# Patient Record
Sex: FEMALE | Employment: OTHER | URBAN - METROPOLITAN AREA
[De-identification: names, ages, dates, MRNs, and addresses within clinical notes are randomized per-mention and may not be internally consistent; named-entity substitution may affect disease eponyms.]

---

## 2018-04-17 ENCOUNTER — HOSPITAL ENCOUNTER (OUTPATIENT)
Dept: OUTPATIENT PROCEDURES | Facility: CLINIC | Age: 78
Discharge: HOME | End: 2018-04-17
Attending: OPHTHALMOLOGY | Admitting: OPHTHALMOLOGY
Payer: MEDICARE

## 2018-04-17 PROCEDURE — 66821 AFTER CATARACT LASER SURGERY: CPT | Mod: LT | Performed by: OPHTHALMOLOGY

## 2018-08-28 ENCOUNTER — OFFICE VISIT (OUTPATIENT)
Dept: OTOLARYNGOLOGY | Facility: CLINIC | Age: 78
End: 2018-08-28
Payer: COMMERCIAL

## 2018-08-28 VITALS
BODY MASS INDEX: 25.11 KG/M2 | SYSTOLIC BLOOD PRESSURE: 157 MMHG | WEIGHT: 133 LBS | HEART RATE: 72 BPM | TEMPERATURE: 97.8 F | HEIGHT: 61 IN | DIASTOLIC BLOOD PRESSURE: 76 MMHG

## 2018-08-28 DIAGNOSIS — H92.20 BLOOD IN EAR CANAL, UNSPECIFIED LATERALITY: Primary | ICD-10-CM

## 2018-08-28 PROCEDURE — 99203 OFFICE O/P NEW LOW 30 MIN: CPT | Performed by: OTOLARYNGOLOGY

## 2018-08-28 RX ORDER — LISINOPRIL 2.5 MG/1
2.5 TABLET ORAL DAILY
COMMUNITY
Start: 2018-08-20

## 2018-08-28 RX ORDER — VIT A/VIT C/VIT E/ZINC/COPPER 4296-226
2 CAPSULE ORAL DAILY
COMMUNITY
Start: 2008-12-29

## 2018-08-28 RX ORDER — SENNOSIDES 8.6 MG
650 CAPSULE ORAL DAILY PRN
COMMUNITY
Start: 2018-02-27

## 2018-08-28 RX ORDER — PHENOL 1.4 %
600 AEROSOL, SPRAY (ML) MUCOUS MEMBRANE DAILY
COMMUNITY
Start: 2006-08-21

## 2018-08-28 RX ORDER — LANOLIN ALCOHOL/MO/W.PET/CERES
1000 CREAM (GRAM) TOPICAL DAILY
COMMUNITY
Start: 2017-07-20

## 2018-08-28 RX ORDER — GABAPENTIN 100 MG/1
200 CAPSULE ORAL PRN
COMMUNITY
Start: 2018-02-02

## 2018-08-28 RX ORDER — TRAMADOL HYDROCHLORIDE 50 MG/1
50 TABLET ORAL PRN
COMMUNITY
Start: 2009-05-06 | End: 2024-04-08

## 2018-08-28 RX ORDER — ONDANSETRON 4 MG/1
4 TABLET, ORALLY DISINTEGRATING ORAL PRN
COMMUNITY
Start: 2018-03-23

## 2018-08-28 RX ORDER — BETAMETHASONE DIPROPIONATE 0.5 MG/G
0.05 CREAM TOPICAL DAILY
COMMUNITY
Start: 2018-02-08

## 2018-08-28 RX ORDER — LEVOTHYROXINE SODIUM 75 UG/1
75 TABLET ORAL DAILY
COMMUNITY
Start: 2006-08-21

## 2018-08-28 ASSESSMENT — PAIN SCALES - GENERAL: PAINLEVEL: NO PAIN (0)

## 2018-08-28 NOTE — MR AVS SNAPSHOT
"              After Visit Summary   2018    Sharon Dinh    MRN: [id number]           Patient Information     Date Of Birth          1940        Visit Information        Provider Department      2018 8:45 AM Laurita Parham MD [facility]        Today's Diagnoses     Blood in ear canal, unspecified laterality    -  1      Care Instructions    Per Physician's instructions            Follow-ups after your visit        Who to contact     If you have questions or need follow up information about today's clinic visit or your schedule please contact [facility] directly at [phone].  Normal or non-critical lab and imaging results will be communicated to you by [employer], letter or phone within 4 business days after the clinic has received the results. If you do not hear from us within 7 days, please contact the clinic through [employer] or phone. If you have a critical or abnormal lab result, we will notify you by phone as soon as possible.  Submit refill requests through [employer] or call your pharmacy and they will forward the refill request to us. Please allow 3 business days for your refill to be completed.          Additional Information About Your Visit        MyChart Information     [employer] lets you send messages to your doctor, view your test results, renew your prescriptions, schedule appointments and more. To sign up, go to [email or web address] . Click on \"Log in\" on the left side of the screen, which will take you to the Welcome page. Then click on \"Sign up Now\" on the right side of the page.     You will be asked to enter the access code listed below, as well as some personal information. Please follow the directions to create your username and password.     Your access code is: [email or web address]  Expires: 2019 11:33 AM     Your access code will  in 90 days. If you need help or a new code, please call your [facility] or [phone].        Care EveryWhere " "ID     This is your Care EveryWhere ID. This could be used by other organizations to access your [place] medical records  [id number]        Your Vitals Were     Pulse Temperature Height BMI (Body Mass Index)          72 97.8  F (36.6  C) (Tympanic) 1.549 m (5' 1\") 25.13 kg/m2         Blood Pressure from Last 3 Encounters:   08/28/18 157/76    Weight from Last 3 Encounters:   08/28/18 60.3 kg (133 lb)              Today, you had the following     No orders found for display         Today's Medication Changes          These changes are accurate as of 8/28/18 11:59 PM.  If you have any questions, ask your nurse or doctor.               Stop taking these medicines if you haven't already. Please contact your care team if you have questions.     FLAGYL 500 MG tablet   Generic drug:  metroNIDAZOLE   Stopped by:  Laurita Parham MD           LOMOTIL PO   Stopped by:  Laurita Parham MD           PERCOCET 5-325 MG per tablet   Generic drug:  oxyCODONE-acetaminophen   Stopped by:  Laurita Parham MD                   Information about OPIOIDS     PRESCRIPTION OPIOIDS: WHAT YOU NEED TO KNOW   We gave you an opioid (narcotic) pain medicine. It is important to manage your pain, but opioids are not always the best choice. You should first try all the other options your care team gave you. Take this medicine for as short a time (and as few doses) as possible.    Some activities can increase your pain, such as bandage changes or therapy sessions. It may help to take your pain medicine 30 to 60 minutes before these activities. Reduce your stress by getting enough sleep, working on hobbies you enjoy and practicing relaxation or meditation. Talk to your care team about ways to manage your pain beyond prescription opioids.    These medicines have risks:    DO NOT drive when on new or higher doses of pain medicine. These medicines can affect your alertness and reaction times, and you could be arrested for driving under the influence " (DUI). If you need to use opioids long-term, talk to your care team about driving.    DO NOT operate heavy machinery    DO NOT do any other dangerous activities while taking these medicines.    DO NOT drink any alcohol while taking these medicines.     If the opioid prescribed includes acetaminophen, DO NOT take with any other medicines that contain acetaminophen. Read all labels carefully. Look for the word  acetaminophen  or  Tylenol.  Ask your pharmacist if you have questions or are unsure.    You can get addicted to pain medicines, especially if you have a history of addiction (chemical, alcohol or substance dependence). Talk to your care team about ways to reduce this risk.    All opioids tend to cause constipation. Drink plenty of water and eat foods that have a lot of fiber, such as fruits, vegetables, prune juice, apple juice and high-fiber cereal. Take a laxative (Miralax, milk of magnesia, Colace, Senna) if you don t move your bowels at least every other day. Other side effects include upset stomach, sleepiness, dizziness, throwing up, tolerance (needing more of the medicine to have the same effect), physical dependence and slowed breathing.    Store your pills in a secure place, locked if possible. We will not replace any lost or stolen medicine. If you don t finish your medicine, please throw away (dispose) as directed by your pharmacist. The [place] Pollution Control Agency has more information about safe disposal: https://www.pca.[facility].mn.us/living-green/managing-unwanted-medications         Primary Care Provider    Chyna Lopez MD       No address on file        Equal Access to Services     DAILY CHEN AH: Hadii cyn Rodriguez, waaxda lujoannadaha, qaybta kaalmada carson, yuki olivo. So [facility] [phone].    ATENCIÓN: Si habla español, tiene a larsen disposición servicios gratuitos de asistencia lingüística. Llame al [phone].    We comply with applicable  federal civil rights laws and [place] laws. We do not discriminate on the basis of race, color, national origin, age, disability, sex, sexual orientation, or gender identity.            Thank you!     Thank you for choosing [facility]  for your care. Our goal is always to provide you with excellent care. Hearing back from our patients is one way we can continue to improve our services. Please take a few minutes to complete the written survey that you may receive in the mail after your visit with us. Thank you!             Your Updated Medication List - Protect others around you: Learn how to safely use, store and throw away your medicines at www.disposemymeds.org.          This list is accurate as of 8/28/18 11:59 PM.  Always use your most recent med list.                   Brand Name Dispense Instructions for use Diagnosis    acetaminophen 650 MG CR tablet    TYLENOL     Take 650 mg by mouth daily as needed        betamethasone dipropionate 0.05 % cream    DIPROSONE     Apply 0.05 mg topically daily        calcium carbonate 600 MG tablet   Generic drug:  calcium carbonate      Take 600 mg by mouth daily        cyanocobalamin 1000 MCG tablet    vitamin  B-12     Take 1,000 mcg by mouth daily        gabapentin 100 MG capsule    NEURONTIN     Take 200 mg by mouth as needed        lisinopril 2.5 MG tablet    PRINIVIL/Zestril     Take 2.5 mg by mouth daily        ondansetron 4 MG ODT tab    ZOFRAN-ODT     Place 4 mg under the tongue as needed        PRESERVISION AREDS Caps      Take 2 tablets by mouth daily        SM CALCIUM 500/VITAMIN D3 500-400 MG-UNIT Tabs per tablet   Generic drug:  calcium carbonate-vitamin D      Take 1 tablet by mouth daily        SYNTHROID 75 MCG tablet   Generic drug:  levothyroxine      Take 75 mcg by mouth daily        traMADol 50 MG tablet    ULTRAM     Take 50 mg by mouth as needed

## 2018-08-28 NOTE — PROGRESS NOTES
History of Present Illness - Sharon Dinh is a 78 year old female who presents with a 2 week history of having blood and pain in the right ear. The ear now just feels plugged on the right. She denies placing anything in the ear. She has no prior ear surgery.    Past Medical History -   No ear surgery    Current Medications -   Current Outpatient Prescriptions:      acetaminophen (TYLENOL) 650 MG CR tablet, Take 650 mg by mouth daily as needed, Disp: , Rfl:      betamethasone dipropionate (DIPROSONE) 0.05 % cream, Apply 0.05 mg topically daily, Disp: , Rfl:      calcium carbonate (CALCIUM CARBONATE) 600 MG tablet, Take 600 mg by mouth daily, Disp: , Rfl:      calcium carbonate-vitamin D (SM CALCIUM 500/VITAMIN D3) 500-400 MG-UNIT TABS per tablet, Take 1 tablet by mouth daily, Disp: , Rfl:      cyanocobalamin (VITAMIN  B-12) 1000 MCG tablet, Take 1,000 mcg by mouth daily, Disp: , Rfl:      gabapentin (NEURONTIN) 100 MG capsule, Take 200 mg by mouth as needed, Disp: , Rfl:      levothyroxine (SYNTHROID) 75 MCG tablet, Take 75 mcg by mouth daily, Disp: , Rfl:      lisinopril (PRINIVIL/ZESTRIL) 2.5 MG tablet, Take 2.5 mg by mouth daily, Disp: , Rfl:      Multiple Vitamins-Minerals (PRESERVISION AREDS) CAPS, Take 2 tablets by mouth daily, Disp: , Rfl:      ondansetron (ZOFRAN-ODT) 4 MG ODT tab, Place 4 mg under the tongue as needed, Disp: , Rfl:      traMADol (ULTRAM) 50 MG tablet, Take 50 mg by mouth as needed, Disp: , Rfl:     Allergies -   Allergies   Allergen Reactions     Beta Adrenergic Blockers      lightheadedness     [employer] Hives     Pt denies topical iodine allergy     Diphenhydramine      Other reaction(s): Other  Made pt's blood pressure go up     Food      Soy sauce causing swollen lips     Vegetable Enzyme Nausea       Social History -   Social History     Social History     Marital status:      Spouse name: N/A     Number of children: N/A     Years of education: N/A  "    Social History Main Topics     Smoking status: Not on file     Smokeless tobacco: Not on file     Alcohol use Not on file     Drug use: Not on file     Sexual activity: Not on file     Other Topics Concern     Not on file     Social History Narrative       Family History -   Family History   Problem Relation Age of Onset     Cerebrovascular Disease Mother      Colon Cancer Mother      Coronary Artery Disease Father      Hypertension Father        Review of Systems - As per HPI and PMHx, otherwise 7 system review of the head and neck negative. 10+ system review negative.    Physical Exam  /76 (BP Location: Right arm, Patient Position: Sitting, Cuff Size: Adult Regular)  Pulse 72  Temp 97.8  F (36.6  C) (Tympanic)  Ht 1.549 m (5' 1\")  Wt 60.3 kg (133 lb)  BMI 25.13 kg/m2  General - The patient is well nourished and well developed, and appears to have good nutritional status.  Alert and oriented to person and place, answers questions and cooperates with examination appropriately.   Head and Face - Normocephalic and atraumatic, with no gross asymmetry noted of the contour of the facial features.  The facial nerve is intact, with strong symmetric movements.  Voice and Breathing - The patient was breathing comfortably without the use of accessory muscles. There was no wheezing, stridor, or stertor.  The patients voice was clear and strong, and had appropriate pitch and quality.  Ears - Right ear canal with some dried blood. This was removed under the microscope, and the TM and middle ear appeared normal.  Eyes - Extraocular movements intact.  Sclera were not icteric or injected, conjunctiva were pink and moist.  Mouth - Examination of the oral cavity showed pink, healthy oral mucosa. No lesions or ulcerations noted.  The tongue was mobile and midline, and the dentition were in good condition.    Throat - The walls of the oropharynx were smooth, pink, moist, symmetric, and had no lesions or ulcerations.  The " tonsillar pillars and soft palate were symmetric.  The uvula was midline on elevation.  Neck - Normal midline excursion of the laryngotracheal complex during swallowing.  Full range of motion on passive movement.  Palpation of the occipital, submental, submandibular, internal jugular chain, and supraclavicular nodes did not demonstrate any abnormal lymph nodes or masses.  The carotid pulse was palpable bilaterally.  Palpation of the thyroid was soft and smooth, with no nodules or goiter appreciated.  The trachea was mobile and midline.  Nose - External contour is symmetric, no gross deflection or scars.  Nasal mucosa is pink and moist with no abnormal mucus.  The septum was midline and non-obstructive, turbinates of normal size and position.  No polyps, masses, or purulence noted on examination.          Assessment - Sharon Dinh is a 78 year old female with likely trauma to the right ear canal. No sign of infection. I recommended avoiding placing anything in the ears. Return if bleeding or drainage resumes.       Dr. Laurita Parham MD  Otolaryngology  [facility]

## 2018-08-28 NOTE — Clinical Note
8/28/2018         RE: Sharon Dinh  [address] [facility] [address]        Dear Colleague,    Thank you for referring your patient, Sharon Dinh, to the [facility]. Please see a copy of my visit note below.        History of Present Illness - Sharon Dinh is a 78 year old female who presents with a 2 week history of having blood and pain in the right ear. The ear now just feels plugged on the right. She denies placing anything in the ear. She has no prior ear surgery.    Past Medical History -   No ear surgery    Current Medications -   Current Outpatient Prescriptions:      acetaminophen (TYLENOL) 650 MG CR tablet, Take 650 mg by mouth daily as needed, Disp: , Rfl:      betamethasone dipropionate (DIPROSONE) 0.05 % cream, Apply 0.05 mg topically daily, Disp: , Rfl:      calcium carbonate (CALCIUM CARBONATE) 600 MG tablet, Take 600 mg by mouth daily, Disp: , Rfl:      calcium carbonate-vitamin D (SM CALCIUM 500/VITAMIN D3) 500-400 MG-UNIT TABS per tablet, Take 1 tablet by mouth daily, Disp: , Rfl:      cyanocobalamin (VITAMIN  B-12) 1000 MCG tablet, Take 1,000 mcg by mouth daily, Disp: , Rfl:      gabapentin (NEURONTIN) 100 MG capsule, Take 200 mg by mouth as needed, Disp: , Rfl:      levothyroxine (SYNTHROID) 75 MCG tablet, Take 75 mcg by mouth daily, Disp: , Rfl:      lisinopril (PRINIVIL/ZESTRIL) 2.5 MG tablet, Take 2.5 mg by mouth daily, Disp: , Rfl:      Multiple Vitamins-Minerals (PRESERVISION AREDS) CAPS, Take 2 tablets by mouth daily, Disp: , Rfl:      ondansetron (ZOFRAN-ODT) 4 MG ODT tab, Place 4 mg under the tongue as needed, Disp: , Rfl:      traMADol (ULTRAM) 50 MG tablet, Take 50 mg by mouth as needed, Disp: , Rfl:     Allergies -   Allergies   Allergen Reactions     Beta Adrenergic Blockers      lightheadedness     Diagn[employer] Hives     Pt denies topical iodine allergy     Diphenhydramine      Other reaction(s): Other  Made pt's blood pressure go up      "Food      Soy sauce causing swollen lips     Vegetable Enzyme Nausea       Social History -   Social History     Social History     Marital status:      Spouse name: N/A     Number of children: N/A     Years of education: N/A     Social History Main Topics     Smoking status: Not on file     Smokeless tobacco: Not on file     Alcohol use Not on file     Drug use: Not on file     Sexual activity: Not on file     Other Topics Concern     Not on file     Social History Narrative       Family History -   Family History   Problem Relation Age of Onset     Cerebrovascular Disease Mother      Colon Cancer Mother      Coronary Artery Disease Father      Hypertension Father        Review of Systems - As per HPI and PMHx, otherwise 7 system review of the head and neck negative. 10+ system review negative.    Physical Exam  /76 (BP Location: Right arm, Patient Position: Sitting, Cuff Size: Adult Regular)  Pulse 72  Temp 97.8  F (36.6  C) (Tympanic)  Ht 1.549 m (5' 1\")  Wt 60.3 kg (133 lb)  BMI 25.13 kg/m2  General - The patient is well nourished and well developed, and appears to have good nutritional status.  Alert and oriented to person and place, answers questions and cooperates with examination appropriately.   Head and Face - Normocephalic and atraumatic, with no gross asymmetry noted of the contour of the facial features.  The facial nerve is intact, with strong symmetric movements.  Voice and Breathing - The patient was breathing comfortably without the use of accessory muscles. There was no wheezing, stridor, or stertor.  The patients voice was clear and strong, and had appropriate pitch and quality.  Ears - Right ear canal with some dried blood. This was removed under the microscope, and the TM and middle ear appeared normal.  Eyes - Extraocular movements intact.  Sclera were not icteric or injected, conjunctiva were pink and moist.  Mouth - Examination of the oral cavity showed pink, healthy oral " mucosa. No lesions or ulcerations noted.  The tongue was mobile and midline, and the dentition were in good condition.    Throat - The walls of the oropharynx were smooth, pink, moist, symmetric, and had no lesions or ulcerations.  The tonsillar pillars and soft palate were symmetric.  The uvula was midline on elevation.  Neck - Normal midline excursion of the laryngotracheal complex during swallowing.  Full range of motion on passive movement.  Palpation of the occipital, submental, submandibular, internal jugular chain, and supraclavicular nodes did not demonstrate any abnormal lymph nodes or masses.  The carotid pulse was palpable bilaterally.  Palpation of the thyroid was soft and smooth, with no nodules or goiter appreciated.  The trachea was mobile and midline.  Nose - External contour is symmetric, no gross deflection or scars.  Nasal mucosa is pink and moist with no abnormal mucus.  The septum was midline and non-obstructive, turbinates of normal size and position.  No polyps, masses, or purulence noted on examination.          Assessment - Sharon Dinh is a 78 year old female with likely trauma to the right ear canal. No sign of infection. I recommended avoiding placing anything in the ears. Return if bleeding or drainage resumes.       Dr. Laurita Parham MD  Otolaryngology  [facility] [place]        Again, thank you for allowing me to participate in the care of your patient.        Sincerely,        Laurita Parham MD

## 2018-08-28 NOTE — NURSING NOTE
"Initial /76 (BP Location: Right arm, Patient Position: Sitting, Cuff Size: Adult Regular)  Pulse 72  Temp 97.8  F (36.6  C) (Tympanic)  Ht 1.549 m (5' 1\")  Wt 60.3 kg (133 lb)  BMI 25.13 kg/m2 Estimated body mass index is 25.13 kg/(m^2) as calculated from the following:    Height as of this encounter: 1.549 m (5' 1\").    Weight as of this encounter: 60.3 kg (133 lb). .    Mallorie Tsai CMA    "

## 2021-09-25 ENCOUNTER — HOSPITAL ENCOUNTER (EMERGENCY)
Facility: CLINIC | Age: 81
Discharge: HOME | End: 2021-09-25
Attending: FAMILY MEDICINE | Admitting: FAMILY MEDICINE
Payer: MEDICARE

## 2021-09-25 ENCOUNTER — NURSE TRIAGE (OUTPATIENT)
Dept: NURSING | Facility: CLINIC | Age: 81
End: 2021-09-25

## 2021-09-25 ENCOUNTER — APPOINTMENT (OUTPATIENT)
Dept: GENERAL RADIOLOGY | Facility: CLINIC | Age: 81
End: 2021-09-25
Attending: FAMILY MEDICINE
Payer: MEDICARE

## 2021-09-25 VITALS
RESPIRATION RATE: 16 BRPM | OXYGEN SATURATION: 99 % | WEIGHT: 125 LBS | HEART RATE: 75 BPM | SYSTOLIC BLOOD PRESSURE: 165 MMHG | TEMPERATURE: 97.6 F | DIASTOLIC BLOOD PRESSURE: 76 MMHG | BODY MASS INDEX: 23.62 KG/M2

## 2021-09-25 DIAGNOSIS — M94.0 COSTOCHONDRITIS: ICD-10-CM

## 2021-09-25 LAB
ALBUMIN SERPL-MCNC: 4.2 G/DL (ref 3.4–5)
ALP SERPL-CCNC: 111 U/L (ref 40–150)
ALT SERPL W P-5'-P-CCNC: 18 U/L (ref 0–50)
ANION GAP SERPL CALCULATED.3IONS-SCNC: 5 MMOL/L (ref 3–14)
AST SERPL W P-5'-P-CCNC: 15 U/L (ref 0–45)
BASOPHILS # BLD AUTO: 0.1 10E3/UL (ref 0–0.2)
BASOPHILS NFR BLD AUTO: 1 %
BILIRUB SERPL-MCNC: 0.3 MG/DL (ref 0.2–1.3)
BUN SERPL-MCNC: 19 MG/DL (ref 7–30)
CALCIUM SERPL-MCNC: 8.7 MG/DL (ref 8.5–10.1)
CHLORIDE BLD-SCNC: 107 MMOL/L (ref 94–109)
CO2 SERPL-SCNC: 28 MMOL/L (ref 20–32)
CREAT SERPL-MCNC: 0.89 MG/DL (ref 0.52–1.04)
EOSINOPHIL # BLD AUTO: 0.1 10E3/UL (ref 0–0.7)
EOSINOPHIL NFR BLD AUTO: 2 %
ERYTHROCYTE [DISTWIDTH] IN BLOOD BY AUTOMATED COUNT: 12.8 % (ref 10–15)
GFR SERPL CREATININE-BSD FRML MDRD: 61 ML/MIN/1.73M2
GLUCOSE BLD-MCNC: 86 MG/DL (ref 70–99)
HCT VFR BLD AUTO: 39.5 % (ref 35–47)
HGB BLD-MCNC: 12.7 G/DL (ref 11.7–15.7)
HOLD SPECIMEN: NORMAL
IMM GRANULOCYTES # BLD: 0 10E3/UL
IMM GRANULOCYTES NFR BLD: 0 %
LYMPHOCYTES # BLD AUTO: 1.8 10E3/UL (ref 0.8–5.3)
LYMPHOCYTES NFR BLD AUTO: 20 %
MCH RBC QN AUTO: 30.3 PG (ref 26.5–33)
MCHC RBC AUTO-ENTMCNC: 32.2 G/DL (ref 31.5–36.5)
MCV RBC AUTO: 94 FL (ref 78–100)
MONOCYTES # BLD AUTO: 1 10E3/UL (ref 0–1.3)
MONOCYTES NFR BLD AUTO: 11 %
NEUTROPHILS # BLD AUTO: 5.8 10E3/UL (ref 1.6–8.3)
NEUTROPHILS NFR BLD AUTO: 66 %
NRBC # BLD AUTO: 0 10E3/UL
NRBC BLD AUTO-RTO: 0 /100
PLATELET # BLD AUTO: 235 10E3/UL (ref 150–450)
POTASSIUM BLD-SCNC: 4.3 MMOL/L (ref 3.4–5.3)
PROT SERPL-MCNC: 7.8 G/DL (ref 6.8–8.8)
RBC # BLD AUTO: 4.19 10E6/UL (ref 3.8–5.2)
SODIUM SERPL-SCNC: 140 MMOL/L (ref 133–144)
TROPONIN I SERPL-MCNC: <0.015 UG/L (ref 0–0.04)
WBC # BLD AUTO: 8.8 10E3/UL (ref 4–11)

## 2021-09-25 PROCEDURE — 93005 ELECTROCARDIOGRAM TRACING: CPT | Performed by: FAMILY MEDICINE

## 2021-09-25 PROCEDURE — 84484 ASSAY OF TROPONIN QUANT: CPT | Performed by: FAMILY MEDICINE

## 2021-09-25 PROCEDURE — 99285 EMERGENCY DEPT VISIT HI MDM: CPT | Mod: 25 | Performed by: FAMILY MEDICINE

## 2021-09-25 PROCEDURE — 36415 COLL VENOUS BLD VENIPUNCTURE: CPT | Performed by: FAMILY MEDICINE

## 2021-09-25 PROCEDURE — 93010 ELECTROCARDIOGRAM REPORT: CPT | Performed by: FAMILY MEDICINE

## 2021-09-25 PROCEDURE — 71046 X-RAY EXAM CHEST 2 VIEWS: CPT

## 2021-09-25 PROCEDURE — 85025 COMPLETE CBC W/AUTO DIFF WBC: CPT | Performed by: FAMILY MEDICINE

## 2021-09-25 PROCEDURE — 80053 COMPREHEN METABOLIC PANEL: CPT | Performed by: FAMILY MEDICINE

## 2021-09-25 NOTE — TELEPHONE ENCOUNTER
Patient calling about chest pain that started at 9:15 am that started on the left side but now it has spread across her chest and feels very heavy.  Protocol recommmends 911.   Patient agrees to hang up with me and call 911.  Kianna Casanova RN   09/25/21 12:39 PM  [facility] Nurse Advisor  COVID 19 Nurse Triage Plan/Patient Instructions    Please be aware that novel coronavirus (COVID-19) may be circulating in the community. If you develop symptoms such as fever, cough, or SOB or if you have concerns about the presence of another infection including coronavirus (COVID-19), please contact your health care provider or visit [email or web address].     Disposition/Instructions    Call to EMS/911 recommended. Follow protocol based instructions.     Bring Your Own Device:  Please also bring your smart device(s) (smart phones, tablets, laptops) and their charging cables for your personal use and to communicate with your care team during your visit.    Thank you for taking steps to prevent the spread of this virus.  o Limit your contact with others.  o Wear a simple mask to cover your cough.  o Wash your hands well and often.    Resources    [facility]: About COVID-19: [email or web address]    CDC: What to Do If You're Sick: www.cdc.gov/coronavirus/2019-ncov/about/steps-when-sick.html    CDC: Ending Home Isolation: www.cdc.gov/coronavirus/2019-ncov/hcp/disposition-in-home-patients.html     CDC: Caring for Someone: www.cdc.gov/coronavirus/2019-ncov/if-you-are-sick/care-for-someone.html     [facility]: Interim Guidance for Hospital Discharge to Home: www.health.[facility].mn.us/diseases/coronavirus/hcp/hospdischarge.pdf    [facility] clinical trials (COVID-19 research studies): [email or web address]/umn-[email or web address]-trials     Below are the COVID-19 hotlines at the [facility] of [facility] ([facility]). Interpreters are available.   o For health questions: Call 833-306-3560 or 1-132.284.7642 (7  a.m. to 7 p.m.)  o For questions about schools and childcare: Call 855-628-4319 or 1-441.345.3407 (7 a.m. to 7 p.m.)     Reason for Disposition    [1] Chest pain lasts > 5 minutes AND [2] described as crushing, pressure-like, or heavy    Additional Information    Negative: Severe difficulty breathing (e.g., struggling for each breath, speaks in single words)    Negative: Difficult to awaken or acting confused (e.g., disoriented, slurred speech)    Negative: Shock suspected (e.g., cold/pale/clammy skin, too weak to stand, low BP, rapid pulse)    Negative: [1] Chest pain lasts > 5 minutes AND [2] history of heart disease  (i.e., heart attack, bypass surgery, angina, angioplasty, CHF; not just a heart murmur)    Protocols used: CHEST PAIN-A-AH

## 2021-09-25 NOTE — ED PROVIDER NOTES
"  History     Chief Complaint   Patient presents with     Chest Pain     HPI  Sharon Dinh is a 81 year old female who comes in from home having had an episode of chest pain this morning.  It occurred about 9 AM at rest.  She describes it as a \"half dollar size\" pain above the left breast.  She pressed on the area and it hurt to the touch.  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  When she stood up the pain again abated.  She went home.  It has not gone away completely.  She took 4 baby aspirin and also improved but still is slightly present.  It has not been associated with nausea, diaphoresis, presyncope, dyspnea.  She has been Covid vaccinated.  The pain is only slightly present now.  It does not radiate to the arm or neck or elsewhere.  He recalls no specific injury.  She denies symptoms of recent illness.  She has not had any cough, sore throat, fever.  She is not having abdominal pain, bowel or new bladder symptoms.    Allergies:  Allergies   Allergen Reactions     Beta Adrenergic Blockers      lightheadedness     Diagnostic X[employer] Hives     Pt denies topical iodine allergy     Diphenhydramine      Other reaction(s): Other  Made pt's blood pressure go up     Food      Soy sauce causing swollen lips     Vegetable Enzyme Nausea       Problem List:    There are no problems to display for this patient.       Past Medical History:    No past medical history on file.    Past Surgical History:    No past surgical history on file.    Family History:    Family History   Problem Relation Age of Onset     Cerebrovascular Disease Mother      Colon Cancer Mother      Coronary Artery Disease Father      Hypertension Father        Social History:  Marital Status:   [2]  Social History     Tobacco Use     Smoking status: Never Smoker     Smokeless tobacco: Never Used   Substance Use Topics "     Alcohol use: No     Drug use: Not on file        Medications:    acetaminophen (TYLENOL) 650 MG CR tablet  betamethasone dipropionate (DIPROSONE) 0.05 % cream  calcium carbonate (CALCIUM CARBONATE) 600 MG tablet  calcium carbonate-vitamin D (SM CALCIUM 500/VITAMIN D3) 500-400 MG-UNIT TABS per tablet  cyanocobalamin (VITAMIN  B-12) 1000 MCG tablet  gabapentin (NEURONTIN) 100 MG capsule  levothyroxine (SYNTHROID) 75 MCG tablet  lisinopril (PRINIVIL/ZESTRIL) 2.5 MG tablet  Multiple Vitamins-Minerals (PRESERVISION AREDS) CAPS  ondansetron (ZOFRAN-ODT) 4 MG ODT tab  traMADol (ULTRAM) 50 MG tablet      Review of Systems    All other systems are reviewed and are negative    Physical Exam   BP: (!) 184/115  Pulse: 77  Temp: 97.6  F (36.4  C)  Resp: 16  Weight: 56.7 kg (125 lb)  SpO2: 98 %      Physical Exam    Nursing note and vitals were reviewed.  Constitutional: Awake and alert, adequately nourished and developed appearing 81-year-old in no apparent discomfort, who does not appear acutely ill, and who answers questions appropriately and cooperates with examination.  HEENT: EOMI.   Neck: Freely mobile.  Cardiovascular: Cardiac examination reveals normal heart rate and regular rhythm without murmur.  Pulmonary/Chest: Breathing is unlabored.  Breath sounds are clear and equal bilaterally.  There no retractions, tachypnea, rales, wheezes, or rhonchi.  He is tender over the left chest costochondral junction at the level of the fifth or sixth rib reproducing her symptoms without overlying erythema, crepitus, subcutaneous emphysema.  Abdomen: Soft, nontender, no HSM or masses rebound or guarding.  Musculoskeletal: Extremities are warm and well-perfused and without edema  Neurological: Alert, oriented, thought content logical, coherent   Skin: Warm, dry, no rashes.  Psychiatric: Affect broad and appropriate.      ED Course        Procedures              EKG Interpretation:      Interpreted by Alexandru Zamorano MD  Time  reviewed: 14:19  Symptoms at time of EKG: none   Rhythm: normal sinus   Rate: normal  Axis: normal  Ectopy: none  Conduction: normal  ST Segments/ T Waves: No ST-T wave changes  Q Waves: none  Comparison to prior: No old EKG available    Clinical Impression: normal EKG          Critical Care time:  none               Results for orders placed or performed during the hospital encounter of 09/25/21 (from the past 24 hour(s))   [place] Draw    Narrative    The following orders were created for panel order [place] Draw.  Procedure                               Abnormality         Status                     ---------                               -----------         ------                     Extra Blue Top Tube[[id number]]                              Final result               Extra Red Top Tube[[id number]]                               Final result               Extra Green Top (Lithium...[[id number]]                      Final result               Extra Green Top (Lithium...[[id number]]                      Final result               Extra Purple Top Tube[[id number]]                            Final result                 Please view results for these tests on the individual orders.   Extra Red Top Tube   Result Value Ref Range    Hold Specimen JIC    Extra Green Top (Lithium Heparin) Tube   Result Value Ref Range    Hold Specimen JIC    Extra Green Top (Lithium Heparin) Tube   Result Value Ref Range    Hold Specimen JIC    Extra Purple Top Tube   Result Value Ref Range    Hold Specimen JIC    CBC with platelets differential    Narrative    The following orders were created for panel order CBC with platelets differential.  Procedure                               Abnormality         Status                     ---------                               -----------         ------                     CBC with platelets and d...[[id number]]                      Final result                 Please view results for these tests on  the individual orders.   Comprehensive metabolic panel   Result Value Ref Range    Sodium 140 133 - 144 mmol/L    Potassium 4.3 3.4 - 5.3 mmol/L    Chloride 107 94 - 109 mmol/L    Carbon Dioxide (CO2) 28 20 - 32 mmol/L    Anion Gap 5 3 - 14 mmol/L    Urea Nitrogen 19 7 - 30 mg/dL    Creatinine 0.89 0.52 - 1.04 mg/dL    Calcium 8.7 8.5 - 10.1 mg/dL    Glucose 86 70 - 99 mg/dL    Alkaline Phosphatase 111 40 - 150 U/L    AST 15 0 - 45 U/L    ALT 18 0 - 50 U/L    Protein Total 7.8 6.8 - 8.8 g/dL    Albumin 4.2 3.4 - 5.0 g/dL    Bilirubin Total 0.3 0.2 - 1.3 mg/dL    GFR Estimate 61 >60 mL/min/1.73m2   Troponin I   Result Value Ref Range    Troponin I <0.015 0.000 - 0.045 ug/L   CBC with platelets and differential   Result Value Ref Range    WBC Count 8.8 4.0 - 11.0 10e3/uL    RBC Count 4.19 3.80 - 5.20 10e6/uL    Hemoglobin 12.7 11.7 - 15.7 g/dL    Hematocrit 39.5 35.0 - 47.0 %    MCV 94 78 - 100 fL    MCH 30.3 26.5 - 33.0 pg    MCHC 32.2 31.5 - 36.5 g/dL    RDW 12.8 10.0 - 15.0 %    Platelet Count 235 150 - 450 10e3/uL    % Neutrophils 66 %    % Lymphocytes 20 %    % Monocytes 11 %    % Eosinophils 2 %    % Basophils 1 %    % Immature Granulocytes 0 %    NRBCs per 100 WBC 0 <1 /100    Absolute Neutrophils 5.8 1.6 - 8.3 10e3/uL    Absolute Lymphocytes 1.8 0.8 - 5.3 10e3/uL    Absolute Monocytes 1.0 0.0 - 1.3 10e3/uL    Absolute Eosinophils 0.1 0.0 - 0.7 10e3/uL    Absolute Basophils 0.1 0.0 - 0.2 10e3/uL    Absolute Immature Granulocytes 0.0 <=0.0 10e3/uL    Absolute NRBCs 0.0 10e3/uL   Extra Blue Top Tube   Result Value Ref Range    Hold Specimen JIC    XR Chest 2 Views    Narrative    EXAM: XR CHEST 2 VW  LOCATION: [facility]  DATE/TIME: 9/25/2021 3:07 PM    INDICATION: chest pain  COMPARISON: None.      Impression    IMPRESSION: Minimal, linear scarring in the right suprahilar region. Lungs are otherwise clear. Heart pulmonary vascularity are normal. No signs of acute disease. Minimal  anterior compression of a midthoracic vertebral body with slight exaggeration of   the kyphosis.       Medications - No data to display    Assessments & Plan (with Medical Decision Making)     81-year-old female presented with chest wall pain as described above.  This was fully reproducible with palpation over the costochondral joint on the left chest.  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  Symptoms are not typical of ACS.  Her EKG was normal.  Laboratory investigation including troponin CBC and blood chemistries were normal.  Chest x-ray was normal.  I have no suspicion for ACS, angina, PE, dissection, or other worrisome causes for her pain after this evaluation.  I suspect costochondritis.  I reviewed my impressions with her.  I recommended acetaminophen.  She cannot take ibuprofen because of a history of fibromuscular dysplasia.  She should return to be seen if she develops new concerning symptoms such as pain brought on by exertion and relieved by rest are associated with diaphoresis, dyspnea, presyncope, or nausea.  She expressed understanding and her questions were all answered.    I have reviewed the nursing notes.    I have reviewed the findings, diagnosis, plan and need for follow up with the patient.       New Prescriptions    No medications on file       Final diagnoses:   Costochondritis       9/25/2021   [facility] EMERGENCY DEPT     Alexandru Zamorano MD  09/25/21 [id number]

## 2021-09-25 NOTE — ED TRIAGE NOTES
Chest pain at 0900, took 2 asa and pain improved. Pt had ems at her home today and brought EKG here.

## 2021-09-25 NOTE — ED NOTES
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

## 2021-09-25 NOTE — DISCHARGE INSTRUCTIONS
You may use acetaminophen 650 mg four times per day if needed for pain.  Avoid aggravating activities such as heavy lifting, pushing, pulling.  Return to be seen if new concerning symptoms develop.

## 2023-11-26 ENCOUNTER — HOSPITAL ENCOUNTER (EMERGENCY)
Facility: CLINIC | Age: 83
Discharge: HOME | End: 2023-11-26
Payer: MEDICARE

## 2023-11-26 VITALS
SYSTOLIC BLOOD PRESSURE: 162 MMHG | HEART RATE: 81 BPM | OXYGEN SATURATION: 97 % | TEMPERATURE: 98.4 F | DIASTOLIC BLOOD PRESSURE: 72 MMHG | RESPIRATION RATE: 16 BRPM

## 2023-11-26 DIAGNOSIS — L02.419 CELLULITIS AND ABSCESS OF LEG: ICD-10-CM

## 2023-11-26 DIAGNOSIS — L03.119 CELLULITIS AND ABSCESS OF LEG: ICD-10-CM

## 2023-11-26 PROCEDURE — G0463 HOSPITAL OUTPT CLINIC VISIT: HCPCS | Mod: 25

## 2023-11-26 PROCEDURE — 10060 I&D ABSCESS SIMPLE/SINGLE: CPT

## 2023-11-26 PROCEDURE — 87205 SMEAR GRAM STAIN: CPT

## 2023-11-26 PROCEDURE — 87070 CULTURE OTHR SPECIMN AEROBIC: CPT

## 2023-11-26 PROCEDURE — 99213 OFFICE O/P EST LOW 20 MIN: CPT | Mod: 25

## 2023-11-26 RX ORDER — CEPHALEXIN 500 MG/1
500 CAPSULE ORAL 4 TIMES DAILY
Qty: 20 CAPSULE | Refills: 0 | Status: SHIPPED | OUTPATIENT
Start: 2023-11-26 | End: 2023-12-01

## 2023-11-26 RX ORDER — SULFAMETHOXAZOLE/TRIMETHOPRIM 800-160 MG
1 TABLET ORAL 2 TIMES DAILY
Qty: 14 TABLET | Refills: 0 | Status: SHIPPED | OUTPATIENT
Start: 2023-11-26 | End: 2023-12-03

## 2023-11-26 NOTE — ED PROVIDER NOTES
"  History     Chief Complaint   Patient presents with    Foot Problems     HPI  Sharon Dinh is a 83 year old female who presents urgent care for concern of cellulitis.    11/16/23: Patient was evaluated in an outpatient emergency department and diagnosed with a cellulitis of the left ankle and placed on Keflex.  11/20/23: Patient evaluated in primary care where abscess was found in the ankle where an I&D was performed and large amount of white discharge was obtained.  Patient continued on Keflex.    Patient returns today with concerns of worsening pain shooting up the leg with ambulation from the left ankle at the site of the prior I&D.  Patient states that overall the erythema seems to be improving, but the area localized around the I&D seems to be worsening.  Also reports another area just distal to the original time.  That appears to be \"bubbling\".  Patient denies any systemic infectious symptoms.  Patient is here with her daughter who assists with the history today.  Reports they have been trying to pack the wound.  They have not noted a significant amount of discharge from the I&D site.  They have not been performing any warm compresses or warm soaks as they were unsure if they should be doing this.  Patient is able to walk but it is very painful to do so.    Allergies:  Allergies   Allergen Reactions    Gabapentin Rash    Beta Adrenergic Blockers      lightheadedness    Diphenhydramine      Other reaction(s): Other  Made pt's blood pressure go up    Food      Soy sauce causing swollen lips    Iodinated Contrast Media Hives     Pt denies topical iodine allergy    Lutein Nausea    Lutein (Vegetable Enzyme) Nausea       Problem List:    There are no problems to display for this patient.       Past Medical History:    No past medical history on file.    Past Surgical History:    No past surgical history on file.    Family History:    Family History   Problem Relation Age of Onset    Cerebrovascular Disease " Mother     Colon Cancer Mother     Coronary Artery Disease Father     Hypertension Father        Social History:  Marital Status:   [2]  Social History     Tobacco Use    Smoking status: Never    Smokeless tobacco: Never   Substance Use Topics    Alcohol use: No        Medications:    cephALEXin (KEFLEX) 500 MG capsule  levothyroxine (SYNTHROID) 75 MCG tablet  sulfamethoxazole-trimethoprim (BACTRIM DS) 800-160 MG tablet  acetaminophen (TYLENOL) 650 MG CR tablet  betamethasone dipropionate (DIPROSONE) 0.05 % cream  calcium carbonate (CALCIUM CARBONATE) 600 MG tablet  calcium carbonate-vitamin D (SM CALCIUM 500/VITAMIN D3) 500-400 MG-UNIT TABS per tablet  cyanocobalamin (VITAMIN  B-12) 1000 MCG tablet  gabapentin (NEURONTIN) 100 MG capsule  lisinopril (PRINIVIL/ZESTRIL) 2.5 MG tablet  Multiple Vitamins-Minerals (PRESERVISION AREDS) CAPS  ondansetron (ZOFRAN-ODT) 4 MG ODT tab  traMADol (ULTRAM) 50 MG tablet          Review of Systems   All other systems reviewed and are negative.    See HPI    Physical Exam   BP: (!) 162/72  Pulse: 81  Temp: 98.4  F (36.9  C)  Resp: 16  SpO2: 97 %      Physical Exam  Constitutional:       General: She is not in acute distress.     Appearance: Normal appearance. She is well-developed.   HENT:      Head: Normocephalic and atraumatic.   Eyes:      General: No scleral icterus.     Conjunctiva/sclera: Conjunctivae normal.   Cardiovascular:      Rate and Rhythm: Normal rate.   Pulmonary:      Effort: Pulmonary effort is normal.   Abdominal:      General: Abdomen is flat.   Musculoskeletal:      Cervical back: Normal range of motion and neck supple.        Feet:       Comments: Erythema noted surrounding original I&D site.  Patient was noted to have an area of tissue excoriation with mild amount of oozing noted just distal to that site.  Is a mild amount of fluctuance with some white thick discharge from the original I&D site.  Normal dorsalis pedis pulse, capillary refill and CMS.    Skin:     General: Skin is warm and dry.      Findings: No rash.   Neurological:      Mental Status: She is alert and oriented to person, place, and time.         ED Course                 [facility]    PROCEDURE: -Incision/Drainage    Date/Time: 11/26/2023 10:52 AM    Performed by: Baldo Burrell APRN CNP  Authorized by: Baldo Burrell APRN CNP    Risks, benefits and alternatives discussed.      LOCATION:      Type:  Abscess    Location:  Lower extremity    Lower extremity location:  Ankle    Ankle location:  L ankle    PROCEDURE TYPE:     Complexity:  Simple    ANESTHESIA (see MAR for exact dosages):     Anesthesia method:  Local infiltration    Local anesthetic:  Bupivacaine 0.5% WITH epi    PROCEDURE DETAILS:     Needle aspiration: no      Incision types:  Single straight    Incision depth:  Subcutaneous    Scalpel blade:  11    Wound management:  Irrigated with saline    Drainage:  Purulent    Drainage amount:  Moderate    Wound treatment:  Wound left open    Packing materials:  None           No results found for this or any previous visit (from the past 24 hour(s)).    Medications - No data to display    Assessments & Plan (with Medical Decision Making)   Patient presented to urgent care for concern of cellulitis.    11/16/23: Patient was evaluated in an outpatient emergency department and diagnosed with a cellulitis of the left ankle and placed on Keflex.    11/20/23: Patient evaluated in primary care where abscess was found in the ankle where an I&D was performed and large amount of white discharge was obtained.  Patient continued on Keflex.    She is afebrile on arrival and vital signs are otherwise reassuring at this time, though she is noted to be slightly hypertensive which I attribute to anxiety and discomfort.  I was able to obtain some additional discharge from the existing I&D site and I did send this for wound culture today.  Patient does have a small area of  fluctuance concerning for additional abscess and I did attempt an I&D in the site as above.  Only a scant amount of thick purulent discharge was obtained.  I irrigated the the I&D sites with sterile saline.  Patient has noted some improvements with the Keflex and will continue this for an additional 5 days and add Bactrim as well for additional coverage.  We can await culture results and direct treatment based on this if needed.  Advise follow-up with primary doctor in 2 to 3 days.  Return precautions for new or worsening symptoms were reviewed with her.  Wound area was dressed with a sterile dressing before discharge today.  Encouraged warm compress to the area or warm soaks.    I have reviewed the nursing notes.    I have reviewed the findings, diagnosis, plan and need for follow up with the patient.        Discharge Medication List as of 11/26/2023 10:36 AM        START taking these medications    Details   cephALEXin (KEFLEX) 500 MG capsule Take 1 capsule (500 mg) by mouth 4 times daily for 5 days, Disp-20 capsule, R-0, E-Prescribe      sulfamethoxazole-trimethoprim (BACTRIM DS) 800-160 MG tablet Take 1 tablet by mouth 2 times daily for 7 days, Disp-14 tablet, R-0, E-Prescribe             Final diagnoses:   Cellulitis and abscess of leg       11/26/2023   [facility] EMERGENCY DEPT    Disclaimer:  This note consists of symbols derived from keyboarding, dictation and/or voice recognition software.  As a result, there may be errors in the script that have gone undetected.  Please consider this when interpreting information found in this chart.         Baldo Burrell, KATIANA CNP  11/26/23 [id number]

## 2023-11-26 NOTE — DISCHARGE INSTRUCTIONS
Warm compress or clean warm soaks to the area. 5 additional days of keflex and add bactrim today. Please follow-up with primary care in 2-3 days. Return here for new symptoms or worsening symptoms.

## 2023-11-26 NOTE — ED TRIAGE NOTES
Pt reports infection in her left foot , provider drained  it and put her on KEFLEX - has a few days left.

## 2023-11-30 ENCOUNTER — TELEPHONE (OUTPATIENT)
Dept: EMERGENCY MEDICINE | Facility: CLINIC | Age: 83
End: 2023-11-30
Payer: COMMERCIAL

## 2023-11-30 LAB
BACTERIA WND CULT: ABNORMAL
BACTERIA WND CULT: ABNORMAL
GRAM STAIN RESULT: ABNORMAL

## 2023-11-30 NOTE — RESULT ENCOUNTER NOTE
Final wound Aerobic Bacterial Culture (specimen - left ankle) report on 11/30/23  [facility] Emergency Dept discharge antibiotic prescribed: Cephalexin (Keflex) 500 mg capsule, 1 capsule (500 mg) by mouth 4 times daily for 5 days.  Bacterial growth:  Actinomyces neuii AND Cutibacterium (Propionibacterium) avidum.  Susceptibilities not routinely done  Incision and Drainage performed in the [place] ED: Yes  Recommendations in treatment per [facility] ED lab result culture protocol

## 2023-11-30 NOTE — TELEPHONE ENCOUNTER
[facility] Emergency Department/Urgent Care Lab result notification  [Note:  ED Lab Results RN will reference the [facility] Emergency Dept visit note prior to contacting patient AND/OR prior to consulting Emergency Dept Provider.  Highlights of Emergency Dept visit in information summary at the bottom of this telephone note]    1. Reason for call  Notify of lab results  Assess patient symptoms [if necessary]  Review ED Providers recommendations/discharge instructions (if necessary)  Advise per [facility] ED lab result protocol    2. Lab Result (including Rx patient on, if applicable).  If culture, copy of lab report at bottom.  Final wound Aerobic Bacterial Culture (specimen - left ankle) report on 11/30/23  [facility] Emergency Dept discharge antibiotic prescribed: Cephalexin (Keflex) 500 mg capsule, 1 capsule (500 mg) by mouth 4 times daily for 5 days and Sulfamethoxazole-Trimethoprim (Bactrim DS, Septra DS) 800-160 mg PO tablet,  1 tablet by mouth 2 times daily for 7 days.   Bacterial growth:  Actinomyces neuii AND Cutibacterium (Propionibacterium) avidum.  Susceptibilities not routinely done  Incision and Drainage performed in the [place] ED: Yes  Recommendations in treatment per [facility] ED lab result culture protocol    3. RN Assessment (Patient's current Symptoms):  Time of call: 5P  Assessment: ankle sore is much better;  pain is less,  little discharge, clearish liquidy drainage   decrease redness.    4. RN Recommendations/Instructions per [place] ED lab result protocol  [facility] ED lab result protocol used: culture  Gabby, mely and Sharon  were notified of lab result and treatment recommendations  Reviewed signs of infection to watch for    5. Please Contact your PCP clinic or return to the Emergency department if your:  Symptoms do not resolve after completing antibiotic.  Symptoms worsen or other concerning symptoms.    Godwin Dye RN  [facility]  Emergency Dept Lab Result RN  Ph# [phone]

## 2023-11-30 NOTE — TELEPHONE ENCOUNTER
[facility] Lab result notification  [Note:  ED Lab Results RN will reference the [facility] Emergency Dept visit note prior to contacting patient AND/OR prior to consulting Emergency Dept Provider.  Highlights of Emergency Dept visit in information summary at the bottom of this telephone note]    1. Reason for call  Notify of lab results  Assess patient symptoms [if necessary]  Review ED Providers recommendations/discharge instructions (if necessary)  Advise per [facility] ED lab result protocol    2. Lab Result (including Rx patient on, if applicable).  If culture, copy of lab report at bottom.  Final wound Aerobic Bacterial Culture (specimen - left ankle) report on 11/30/23  [facility] Emergency Dept discharge antibiotic prescribed: Cephalexin (Keflex) 500 mg capsule, 1 capsule (500 mg) by mouth 4 times daily for 5 days.  Bacterial growth:  Actinomyces neuii AND Cutibacterium (Propionibacterium) avidum.  Susceptibilities not routinely done  Incision and Drainage performed in the [place] ED: Yes  Recommendations in treatment per [facility] ED lab result culture protocol    3. RN Assessment (Patient's current Symptoms):  Time of call: 11/30/2023 4:46 PM  Assessment: Left voicemail message requesting a call back to [facility] ED Lab Result RN at [phone].  RN is available every day between 9 a.m. and 5:30 p.m.      Copy of Lab report (if applicable)  Wound Aerobic Bacterial Culture Routine with Gram Stain  Order: [id number]  Status: Final result       Visible to patient: No (inaccessible in MyChart)    Specimen Information: Ankle, Left; Wound   4 Result Notes  Culture 2+ Actinomyces neuii Abnormal    Susceptibilities not routinely done, refer to antibiogram to view typical susceptibility profiles   2+ Cutibacterium (Propionibacterium) avidum Abnormal    Susceptibility testing of Cutibacterium (Propionibacterium) species is not done from this source. This  organism is susceptible to penicillin and cefotaxime and most are susceptible to clindamycin.            Gram Stain Result  Abnormal   2+ Gram positive cocci      2+ Gram positive bacilli resembling diphtheroids      No white blood cells seen              Resulting Agency: [facility]           Specimen Collected: 11/26/23 10:30 AM Last Resulted: 11/30/23 12:59 PM               Lolita Wilson RN  [facility]  Emergency Dept Lab Result RN  Ph# [phone]

## 2023-12-14 ENCOUNTER — HOSPITAL ENCOUNTER (EMERGENCY)
Facility: CLINIC | Age: 83
Discharge: HOME | End: 2023-12-14
Attending: EMERGENCY MEDICINE | Admitting: EMERGENCY MEDICINE
Payer: MEDICARE

## 2023-12-14 VITALS
DIASTOLIC BLOOD PRESSURE: 83 MMHG | SYSTOLIC BLOOD PRESSURE: 157 MMHG | HEIGHT: 62 IN | TEMPERATURE: 98.1 F | OXYGEN SATURATION: 96 % | BODY MASS INDEX: 22.45 KG/M2 | RESPIRATION RATE: 20 BRPM | HEART RATE: 86 BPM | WEIGHT: 122 LBS

## 2023-12-14 DIAGNOSIS — L03.116 CELLULITIS OF LEFT LOWER EXTREMITY: ICD-10-CM

## 2023-12-14 PROBLEM — E03.9 HYPOTHYROIDISM (ACQUIRED): Status: ACTIVE | Noted: 2023-12-14

## 2023-12-14 PROBLEM — I77.3 FIBROMUSCULAR DYSPLASIA (H): Status: ACTIVE | Noted: 2023-12-14

## 2023-12-14 PROBLEM — E78.5 HYPERLIPIDEMIA: Status: ACTIVE | Noted: 2023-12-14

## 2023-12-14 PROCEDURE — 99283 EMERGENCY DEPT VISIT LOW MDM: CPT | Performed by: EMERGENCY MEDICINE

## 2023-12-14 RX ORDER — CLINDAMYCIN HCL 300 MG
300 CAPSULE ORAL 3 TIMES DAILY
Qty: 21 CAPSULE | Refills: 0 | Status: SHIPPED | OUTPATIENT
Start: 2023-12-14 | End: 2023-12-21

## 2023-12-14 ASSESSMENT — ACTIVITIES OF DAILY LIVING (ADL): ADLS_ACUITY_SCORE: 35

## 2023-12-14 NOTE — ED NOTES
Pt here with left back of ankle infection for the past month. Pt has been on levaquin po twice.Pt says she has two more days of levaquin and the infection is not getting better. Pt does not have pain when she is laying down, but when pt is walking her foot/heel is burning. Pt has not been on IV antibiotics.

## 2023-12-14 NOTE — DISCHARGE INSTRUCTIONS
Take clindamycin as prescribed.     Follow up with wound care when able.     If symptoms fail to improve in one week or your symptoms worsen, you develop a fever, please return to ED.

## 2023-12-14 NOTE — ED TRIAGE NOTES
Last Sunday had left ankle I and D HERE IN      Triage Assessment (Adult)       Row Name 12/14/23 110          Triage Assessment    Airway WDL WDL        Respiratory WDL    Respiratory WDL WDL        Skin Circulation/Temperature WDL    Skin Circulation/Temperature WDL WDL        Cardiac WDL    Cardiac WDL WDL        Peripheral/Neurovascular WDL    Peripheral Neurovascular WDL WDL        Cognitive/Neuro/Behavioral WDL    Cognitive/Neuro/Behavioral WDL WDL

## 2023-12-14 NOTE — ED PROVIDER NOTES
History     Chief Complaint   Patient presents with    Wound Check     HPI  Sharon Dinh is a 83 year old female with history of fibromuscular dysplasia, hypertension, acquired hypothyroidism, hyperlipidemia, with poorly healing wound on left distal leg.  Patient has been seen multiple times at multiple institutions for this over the course of the past month.  Has had incision and drainage of wounds on at least 2 occasions.  Reports being treated with courses of cephalexin, Bactrim and most currently levofloxacin but still has issues.  Areas been more painful with walking recently though signs of redness has improved significantly.  No fevers or chills.  Otherwise feeling well.  Had request for wound care but will not be for another 3 to 4 weeks.  Has 2 days left of second        The patient's PMHx, Surgical Hx, Allergies, and Medications were all reviewed with the patient.    Allergies:  Allergies   Allergen Reactions    Gabapentin Rash    Beta Adrenergic Blockers      lightheadedness    Diphenhydramine      Other reaction(s): Other  Made pt's blood pressure go up    Food      Soy sauce causing swollen lips    Iodinated Contrast Media Hives     Pt denies topical iodine allergy    Lutein Nausea    Lutein (Vegetable Enzyme) Nausea       Problem List:    Patient Active Problem List    Diagnosis Date Noted    Hyperlipidemia 12/14/2023     Priority: Medium    Hypothyroidism (acquired) 12/14/2023     Priority: Medium    Fibromuscular dysplasia (H24) 12/14/2023     Priority: Medium    HTN (hypertension) 03/11/2016     Priority: Medium    Chronic vascular insufficiency of intestine (H24) 06/24/2009     Priority: Medium        Past Medical History:    No past medical history on file.    Past Surgical History:    No past surgical history on file.    Family History:    Family History   Problem Relation Age of Onset    Cerebrovascular Disease Mother     Colon Cancer Mother     Coronary Artery Disease Father      "Hypertension Father        Social History:  Marital Status:   [2]  Social History     Tobacco Use    Smoking status: Never    Smokeless tobacco: Never   Substance Use Topics    Alcohol use: No        Medications:    clindamycin (CLEOCIN) 300 MG capsule  acetaminophen (TYLENOL) 650 MG CR tablet  betamethasone dipropionate (DIPROSONE) 0.05 % cream  calcium carbonate (CALCIUM CARBONATE) 600 MG tablet  calcium carbonate-vitamin D (SM CALCIUM 500/VITAMIN D3) 500-400 MG-UNIT TABS per tablet  cyanocobalamin (VITAMIN  B-12) 1000 MCG tablet  gabapentin (NEURONTIN) 100 MG capsule  levothyroxine (SYNTHROID) 75 MCG tablet  lisinopril (PRINIVIL/ZESTRIL) 2.5 MG tablet  Multiple Vitamins-Minerals (PRESERVISION AREDS) CAPS  ondansetron (ZOFRAN-ODT) 4 MG ODT tab  traMADol (ULTRAM) 50 MG tablet          Review of Systems  Pertinent positives and negatives mentioned in HPI    Physical Exam   BP: (!) 157/83  Pulse: 86  Temp: 98.1  F (36.7  C)  Resp: 20  Height: 157.5 cm (5' 2\")  Weight: 55.3 kg (122 lb)  SpO2: 96 %    Physical Exam  GEN: Awake, alert, and cooperative.   CV : Extremities warm and well perfused.  PULM: Normal effort. Speaking in full sentences.  NEURO: Normal speech. Following commands.  Answering questions and interacting appropriately.   EXT: No gross deformity. Foot is warm w/ good capillary refill. 3 cm circular area w/ central cratering and posterolateral left leg. See photo below. Area is warm and tender.   INT: Warm. No diaphoresis. See EXT above and photo below        ED Course        Procedures                 Critical Care time:  none               No results found for this or any previous visit (from the past 24 hour(s)).    Medications - No data to display    Assessments & Plan (with Medical Decision Making)   83 year old female with poorly healing wounds for past month on the left leg detailed above.  Patient has been treated with cephalexin Bactrim and levofloxacin without resolution although she has " had improvement.  Review of other encounters [facility] in [facility] as well as care everywhere.  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  Discussed this with pharmacy and consulted antibiogram but not listed.  Per conversation with pharmacy and other antibiogram's appears to be sensitive to clindamycin as well as penicillin.  It is possible that actinomyces has not been appropriately treated and still causing issue.  For this reason we will switch to clindamycin.  Also possible that due to her age and underlying comorbidities this wound is just poorly healing and I do feel she would benefit from wound care as well.  Point-of-care ultrasound did not show any pocket of fluid which would be amenable to drainage today.  She is in the process of seeing wound care through different health system but that has not for 3 to 4 weeks.  Referral for wound care placed here in hopes that she can get a more timely appointment.  I would like her to follow-up with her primary care provider regarding this wound and is as care is becoming fragmented and complicated with multiple providers providing while antibiotics.  She seemed okay with this plan.  ED return precautions discussed.      I have reviewed the nursing notes.        Discharge Medication List as of 12/14/2023  1:52 PM        START taking these medications    Details   clindamycin (CLEOCIN) 300 MG capsule Take 1 capsule (300 mg) by mouth 3 times daily for 7 days, Disp-21 capsule, R-0, E-Prescribe             Final diagnoses:   Cellulitis of left lower extremity     Preet Pineda MD        12/14/2023   [facility] EMERGENCY DEPT    Disclaimer: This note consists of words and symbols derived from keyboarding and dictation using voice recognition software.  As a result, there may be errors that have gone undetected.  Please consider  this when interpreting information found in this note.               Preet Pineda MD  12/15/23 112

## 2023-12-21 ENCOUNTER — OFFICE VISIT (OUTPATIENT)
Dept: PODIATRY | Facility: CLINIC | Age: 83
End: 2023-12-21
Payer: COMMERCIAL

## 2023-12-21 VITALS
SYSTOLIC BLOOD PRESSURE: 170 MMHG | BODY MASS INDEX: 22.45 KG/M2 | DIASTOLIC BLOOD PRESSURE: 90 MMHG | HEIGHT: 62 IN | WEIGHT: 122 LBS | HEART RATE: 78 BPM

## 2023-12-21 DIAGNOSIS — L97.321 ULCER OF LEFT ANKLE, LIMITED TO BREAKDOWN OF SKIN (H): ICD-10-CM

## 2023-12-21 DIAGNOSIS — L97.321 SKIN ULCER OF LEFT ANKLE, LIMITED TO BREAKDOWN OF SKIN (H): Primary | ICD-10-CM

## 2023-12-21 PROCEDURE — 99203 OFFICE O/P NEW LOW 30 MIN: CPT | Performed by: PODIATRIST

## 2023-12-21 ASSESSMENT — PAIN SCALES - GENERAL: PAINLEVEL: MILD PAIN (2)

## 2023-12-21 NOTE — NURSING NOTE
"Chief Complaint   Patient presents with    Consult     Left ankle- had an infection- was on anti-biotics        Initial BP (!) 170/90   Pulse 78   Ht 1.575 m (5' 2\")   Wt 55.3 kg (122 lb)   BMI 22.31 kg/m   Estimated body mass index is 22.31 kg/m  as calculated from the following:    Height as of this encounter: 1.575 m (5' 2\").    Weight as of this encounter: 55.3 kg (122 lb).  Medications and allergies reviewed.      Susan FAITH MA    "

## 2023-12-21 NOTE — LETTER
12/21/2023         RE: Sharon Dinh  [address] Abarca [address] [place] [id number]        Dear Colleague,    Thank you for referring your patient, Sharon Dinh, to the [facility]. Please see a copy of my visit note below.    Sharon presents to clinic for reevaluation of the left ankle.  The patient relates developing a wound on the left ankle.  The patient denies any redness, swelling or drainage around the ulcer.  The patient denies any fevers, chills, nausea or vomiting.        Lower Extremity Physical Exam:      Neurovascular status remains intact with palpable pedal pulses and diminished protective sensation on the left foot.  Noted lower extremity edema.  Muscular exam is within normal limits to major muscle groups.      Noted full-thickness ulceration located over the ankle on the left.   No tracking.  No drainage.  No surrounding erythema or edema noted.    Assessment:     ICD-10-CM    1. Skin ulcer of left ankle, limited to breakdown of skin (H)  L97.321       2. Ulcer of left ankle, limited to breakdown of skin (H)  L97.321           Plan:      I have explained to Sharon about the progress of the ulceration.   The ulcer was dressed and the patient will continue following her instructions on wound care bandage changes.       It was reiterated to the patient the importance of offloading the ulceration by any means.  It was explained to the patient that the main reason ulcerations do not heal quickly is due to pressure that damages the newly formed skin cells.  The patient was instructed to monitor for any signs of infection including increased redness, swelling and drainage from around the ulceration.  If the patient notices any fevers, chills, night sweats, nausea or vomiting patient report to the nearest ER for further evaluation of a possible, more serious infection.    Sharon verbalized agreement with and understanding of the rational for  the diagnosis and treatment plan.  All questions were answered to best of my ability and the patient's satisfaction. The patient was advised to contact the clinic with any questions that may arise after the clinic visit.      Disclaimer: This note consists of symbols derived from keyboarding, dictation and/or voice recognition software. As a result, there may be errors in the script that have gone undetected. Please consider this when interpreting information found in this chart.       RAMOS Almanza D.P.M., F.A.C.F.A.S.      Again, thank you for allowing me to participate in the care of your patient.        Sincerely,        Hira Almanza DPM

## 2023-12-21 NOTE — PROGRESS NOTES
Sharon presents to clinic for reevaluation of the left ankle.  The patient relates developing a wound on the left ankle.  The patient denies any redness, swelling or drainage around the ulcer.  The patient denies any fevers, chills, nausea or vomiting.        Lower Extremity Physical Exam:      Neurovascular status remains intact with palpable pedal pulses and diminished protective sensation on the left foot.  Noted lower extremity edema.  Muscular exam is within normal limits to major muscle groups.      Noted full-thickness ulceration located over the ankle on the left.   No tracking.  No drainage.  No surrounding erythema or edema noted.    Assessment:     ICD-10-CM    1. Skin ulcer of left ankle, limited to breakdown of skin (H)  L97.321       2. Ulcer of left ankle, limited to breakdown of skin (H)  L97.321           Plan:      I have explained to Sharon about the progress of the ulceration.   The ulcer was dressed and the patient will continue following her instructions on wound care bandage changes.       It was reiterated to the patient the importance of offloading the ulceration by any means.  It was explained to the patient that the main reason ulcerations do not heal quickly is due to pressure that damages the newly formed skin cells.  The patient was instructed to monitor for any signs of infection including increased redness, swelling and drainage from around the ulceration.  If the patient notices any fevers, chills, night sweats, nausea or vomiting patient report to the nearest ER for further evaluation of a possible, more serious infection.    Sharon verbalized agreement with and understanding of the rational for the diagnosis and treatment plan.  All questions were answered to best of my ability and the patient's satisfaction. The patient was advised to contact the clinic with any questions that may arise after the clinic visit.      Disclaimer: This note consists of symbols derived from  keyboarding, dictation and/or voice recognition software. As a result, there may be errors in the script that have gone undetected. Please consider this when interpreting information found in this chart.       RAMOS Almanza D.P.M., [email or web address].

## 2024-01-13 PROBLEM — L97.321 ULCER OF LEFT ANKLE, LIMITED TO BREAKDOWN OF SKIN (H): Status: ACTIVE | Noted: 2024-01-13

## 2024-04-08 ENCOUNTER — OFFICE VISIT (OUTPATIENT)
Dept: PODIATRY | Facility: CLINIC | Age: 84
End: 2024-04-08
Payer: COMMERCIAL

## 2024-04-08 VITALS
BODY MASS INDEX: 22.45 KG/M2 | WEIGHT: 122 LBS | SYSTOLIC BLOOD PRESSURE: 165 MMHG | HEIGHT: 62 IN | DIASTOLIC BLOOD PRESSURE: 84 MMHG | HEART RATE: 87 BPM

## 2024-04-08 DIAGNOSIS — L30.1 ACUTE VESICULAR ECZEMA OF FOOT: Primary | ICD-10-CM

## 2024-04-08 PROCEDURE — 99213 OFFICE O/P EST LOW 20 MIN: CPT | Performed by: PODIATRIST

## 2024-04-08 NOTE — NURSING NOTE
"Chief Complaint   Patient presents with    Consult     Right foot- redness        Initial BP (!) 165/84   Pulse 87   Ht 1.575 m (5' 2\")   Wt 55.3 kg (122 lb)   BMI 22.31 kg/m   Estimated body mass index is 22.31 kg/m  as calculated from the following:    Height as of this encounter: 1.575 m (5' 2\").    Weight as of this encounter: 55.3 kg (122 lb).  Medications and allergies reviewed.      Susan FAITH MA    "

## 2024-04-08 NOTE — PROGRESS NOTES
"Sharon returns to the office for reevaluation of the right foot.  The patient relates having red blotchy itchy skin on the right foot.  The patient has tried different creams with no relief.  The patient relates no other problems.    Vitals: BP (!) 165/84   Pulse 87   Ht 1.575 m (5' 2\")   Wt 55.3 kg (122 lb)   BMI 22.31 kg/m    BMI= Body mass index is 22.31 kg/m .    Lower Extremity Physical Exam:      Neurovascular status remains unchanged.  Muscular exam is within normal limits to major muscle groups.  Integument is intact.      Noted erythematous xerotic skin over the right foot.  No surrounding erythema edema or ascending cellulitis noted.         Assessment:     ICD-10-CM    1. Acute vesicular eczema of foot  L30.1 Adult Dermatology  Referral          Plan:    I have explained to Sharon about the progress of the conditions.  At this time, the patient was referred to dermatology for further evaluation and treatment options of the skin rash.    Sharon verbalized agreement with and understanding of the rational for the diagnosis and treatment plan.  All questions were answered to best of my ability and the patient's satisfaction. The patient was advised to contact the clinic with any questions that may arise after the clinic visit.      Disclaimer: This note consists of symbols derived from keyboarding, dictation and/or voice recognition software. As a result, there may be errors in the script that have gone undetected. Please consider this when interpreting information found in this chart.       RAMOS Almanza D.P.M., FVASHTI.[email or web address].S.    "

## 2024-04-08 NOTE — LETTER
"    4/8/2024         RE: Sharon Dinh  [address] [place] [phone]        Dear Colleague,    Thank you for referring your patient, Sharon Dinh, to the [facility]. Please see a copy of my visit note below.    Sharon returns to the office for reevaluation of the right foot.  The patient relates having red blotchy itchy skin on the right foot.  The patient has tried different creams with no relief.  The patient relates no other problems.    Vitals: BP (!) 165/84   Pulse 87   Ht 1.575 m (5' 2\")   Wt 55.3 kg (122 lb)   BMI 22.31 kg/m    BMI= Body mass index is 22.31 kg/m .    Lower Extremity Physical Exam:      Neurovascular status remains unchanged.  Muscular exam is within normal limits to major muscle groups.  Integument is intact.      Noted erythematous xerotic skin over the right foot.  No surrounding erythema edema or ascending cellulitis noted.         Assessment:     ICD-10-CM    1. Acute vesicular eczema of foot  L30.1 Adult Dermatology  Referral          Plan:    I have explained to Sharon about the progress of the conditions.  At this time, the patient was referred to dermatology for further evaluation and treatment options of the skin rash.    Sharon verbalized agreement with and understanding of the rational for the diagnosis and treatment plan.  All questions were answered to best of my ability and the patient's satisfaction. The patient was advised to contact the clinic with any questions that may arise after the clinic visit.      Disclaimer: This note consists of symbols derived from keyboarding, dictation and/or voice recognition software. As a result, there may be errors in the script that have gone undetected. Please consider this when interpreting information found in this chart.       RAMOS Almanza D.P.M., F.A.C.F.A.S.      Again, thank you for allowing me to participate in the care of your patient.  "       Sincerely,        Hira Almanza DPM

## 2024-04-23 ENCOUNTER — OFFICE VISIT (OUTPATIENT)
Dept: DERMATOLOGY | Facility: CLINIC | Age: 84
End: 2024-04-23
Attending: PODIATRIST
Payer: COMMERCIAL

## 2024-04-23 DIAGNOSIS — L20.9 ATOPIC DERMATITIS, UNSPECIFIED TYPE: Primary | ICD-10-CM

## 2024-04-23 DIAGNOSIS — L30.1 ACUTE VESICULAR ECZEMA OF FOOT: ICD-10-CM

## 2024-04-23 PROCEDURE — 99203 OFFICE O/P NEW LOW 30 MIN: CPT | Performed by: PHYSICIAN ASSISTANT

## 2024-04-23 RX ORDER — TACROLIMUS 1 MG/G
OINTMENT TOPICAL
Qty: 30 G | Refills: 3 | Status: SHIPPED | OUTPATIENT
Start: 2024-04-23

## 2024-04-23 RX ORDER — CLOBETASOL PROPIONATE 0.5 MG/G
CREAM TOPICAL
Qty: 30 G | Refills: 4 | Status: SHIPPED | OUTPATIENT
Start: 2024-04-23

## 2024-04-23 ASSESSMENT — PAIN SCALES - GENERAL: PAINLEVEL: NO PAIN (0)

## 2024-04-23 NOTE — NURSING NOTE
Chief Complaint   Patient presents with    Rash     On right foot up to ankle        There were no vitals filed for this visit.  Wt Readings from Last 1 Encounters:   04/08/24 55.3 kg (122 lb)       Cassandra Coleman LPN .................4/23/2024

## 2024-04-23 NOTE — PATIENT INSTRUCTIONS
Avoid soaking the skin.   Use dove sensitive cleanser to wash skin daily.   Recommend vanicream, eucerin, cerave moisturizer twice daily.   Apply clobetasol twice daily for 2 weeks then change to tacrolimus ointment twice daily as needed.     Recheck in one month.

## 2024-04-23 NOTE — LETTER
4/23/2024         RE: Sharon Dinh  [address]  [facility] [id number]        Dear Colleague,    Thank you for referring your patient, Sharon Dinh, to the [facility]. Please see a copy of my visit note below.    Sharon Dinh is an extremely pleasant 83 year old year old female patient here today for rash on right foot. Present for a few months itchy. She has used triamcinolone which did help some. She notes is not using moisturizer and soaks in hot water in 3 times daily. Patient has no other skin complaints today.  Remainder of the HPI, Meds, PMH, Allergies, FH, and SH was reviewed in chart.    No past medical history on file.    No past surgical history on file.     Family History   Problem Relation Age of Onset     Cerebrovascular Disease Mother      Colon Cancer Mother      Coronary Artery Disease Father      Hypertension Father        Social History     Socioeconomic History     Marital status:      Spouse name: Not on file     Number of children: Not on file     Years of education: Not on file     Highest education level: Not on file   Occupational History     Not on file   Tobacco Use     Smoking status: Never     Smokeless tobacco: Never   Substance and Sexual Activity     Alcohol use: No     Drug use: Not on file     Sexual activity: Not on file   Other Topics Concern     Parent/sibling w/ CABG, MI or angioplasty before 65F 55M? Not Asked   Social History Narrative     Not on file     Social Determinants of Health     Financial Resource Strain: High Risk (1/1/2022)    Received from [facility], [facility]    Financial Resource Strain      Difficulty of Paying Living Expenses: Not on file      Difficulty of Paying Living Expenses: Not on file   Food Insecurity: Not on file   Transportation Needs: Not on file   Physical Activity: Not on file   Stress: Not on file   Social  Connections: Unknown (1/1/2022)    Received from [facility], [facility]    Social Connections      Frequency of Communication with Friends and Family: Not on file   Interpersonal Safety: Not At Risk (7/19/2019)    Received from [facility], [facility]    Humiliation, Afraid, Rape, and Kick questionnaire      Fear of Current or Ex-Partner: No      Emotionally Abused: No      Physically Abused: No      Sexually Abused: No   Housing Stability: Not on file       Outpatient Encounter Medications as of 4/23/2024   Medication Sig Dispense Refill     clobetasol propionate (TEMOVATE) 0.05 % external cream Apply sparingly twice daily for 2 weeks to rash. 30 g 4     tacrolimus (PROTOPIC) 0.1 % external ointment Apply twice daily to rash as needed. 30 g 3     acetaminophen (TYLENOL) 650 MG CR tablet Take 650 mg by mouth daily as needed       betamethasone dipropionate (DIPROSONE) 0.05 % cream Apply 0.05 mg topically daily       calcium carbonate (CALCIUM CARBONATE) 600 MG tablet Take 600 mg by mouth daily       calcium carbonate-vitamin D (SM CALCIUM 500/VITAMIN D3) 500-400 MG-UNIT TABS per tablet Take 1 tablet by mouth daily       cyanocobalamin (VITAMIN  B-12) 1000 MCG tablet Take 1,000 mcg by mouth daily       gabapentin (NEURONTIN) 100 MG capsule Take 200 mg by mouth as needed (Patient not taking: Reported on 12/21/2023)       levothyroxine (SYNTHROID) 75 MCG tablet Take 75 mcg by mouth daily       lisinopril (PRINIVIL/ZESTRIL) 2.5 MG tablet Take 2.5 mg by mouth daily       Multiple Vitamins-Minerals (PRESERVISION AREDS) CAPS Take 2 tablets by mouth daily       ondansetron (ZOFRAN-ODT) 4 MG ODT tab Place 4 mg under the tongue as needed (Patient not taking: Reported on 4/8/2024)       No facility-administered encounter medications on file as of 4/23/2024.             O:   NAD, WDWN, Alert &  Oriented, Mood & Affect wnl, Vitals stable   Here today alone   There were no vitals taken for this visit.   General appearance normal   Vitals stable   Alert, oriented and in no acute distress      Eczematous plaque on right ankle, dorsal foot     Eyes: Conjunctivae/lids:Normal     ENT: Lips normal    MSK:Normal    Pulm: Breathing Normal    Neuro/Psych: Orientation:Alert and Orientedx3 ; Mood/Affect:normal   A/P:  1. Atopic dermatitis   Avoid soaking the skin.   Use dove sensitive cleanser to wash skin daily.   Recommend vanicream, eucerin, cerave moisturizer twice daily.   Apply clobetasol twice daily for 2 weeks then change to tacrolimus ointment twice daily as needed.   Recheck in one month.       Again, thank you for allowing me to participate in the care of your patient.        Sincerely,        Mar Sanz PA-C

## 2024-04-23 NOTE — PROGRESS NOTES
Sharon Dinh is an extremely pleasant 83 year old year old female patient here today for rash on right foot. Present for a few months itchy. She has used triamcinolone which did help some. She notes is not using moisturizer and soaks in hot water in 3 times daily. Patient has no other skin complaints today.  Remainder of the HPI, Meds, PMH, Allergies, FH, and SH was reviewed in chart.    No past medical history on file.    No past surgical history on file.     Family History   Problem Relation Age of Onset    Cerebrovascular Disease Mother     Colon Cancer Mother     Coronary Artery Disease Father     Hypertension Father        Social History     Socioeconomic History    Marital status:      Spouse name: Not on file    Number of children: Not on file    Years of education: Not on file    Highest education level: Not on file   Occupational History    Not on file   Tobacco Use    Smoking status: Never    Smokeless tobacco: Never   Substance and Sexual Activity    Alcohol use: No    Drug use: Not on file    Sexual activity: Not on file   Other Topics Concern    Parent/sibling w/ CABG, MI or angioplasty before 65F 55M? Not Asked   Social History Narrative    Not on file     Social Determinants of Health     Financial Resource Strain: High Risk (1/1/2022)    [facility]    Financial Resource Strain     Difficulty of Paying Living Expenses: Not on file     Difficulty of Paying Living Expenses: Not on file   Food Insecurity: Not on file   Transportation Needs: Not on file   Physical Activity: Not on file   Stress: Not on file   Social Connections: Unknown (1/1/2022)    [facility], [facility]    Social Connections     Frequency of Communication with Friends and Family: Not on file   Interpersonal Safety: Not At Risk (7/19/2019)    Received  [facility], [facility]    Humiliation, Afraid, Rape, and Kick questionnaire     Fear of Current or Ex-Partner: No     Emotionally Abused: No     Physically Abused: No     Sexually Abused: No   Housing Stability: Not on file       Outpatient Encounter Medications as of 4/23/2024   Medication Sig Dispense Refill    clobetasol propionate (TEMOVATE) 0.05 % external cream Apply sparingly twice daily for 2 weeks to rash. 30 g 4    tacrolimus (PROTOPIC) 0.1 % external ointment Apply twice daily to rash as needed. 30 g 3    acetaminophen (TYLENOL) 650 MG CR tablet Take 650 mg by mouth daily as needed      betamethasone dipropionate (DIPROSONE) 0.05 % cream Apply 0.05 mg topically daily      calcium carbonate (CALCIUM CARBONATE) 600 MG tablet Take 600 mg by mouth daily      calcium carbonate-vitamin D (SM CALCIUM 500/VITAMIN D3) 500-400 MG-UNIT TABS per tablet Take 1 tablet by mouth daily      cyanocobalamin (VITAMIN  B-12) 1000 MCG tablet Take 1,000 mcg by mouth daily      gabapentin (NEURONTIN) 100 MG capsule Take 200 mg by mouth as needed (Patient not taking: Reported on 12/21/2023)      levothyroxine (SYNTHROID) 75 MCG tablet Take 75 mcg by mouth daily      lisinopril (PRINIVIL/ZESTRIL) 2.5 MG tablet Take 2.5 mg by mouth daily      Multiple Vitamins-Minerals (PRESERVISION AREDS) CAPS Take 2 tablets by mouth daily      ondansetron (ZOFRAN-ODT) 4 MG ODT tab Place 4 mg under the tongue as needed (Patient not taking: Reported on 4/8/2024)       No facility-administered encounter medications on file as of 4/23/2024.             O:   NAD, WDWN, Alert & Oriented, Mood & Affect wnl, Vitals stable   Here today alone   There were no vitals taken for this visit.   General appearance normal   Vitals stable   Alert, oriented and in no acute distress      Eczematous plaque on right ankle, dorsal foot     Eyes: Conjunctivae/lids:Normal     ENT: Lips  normal    MSK:Normal    Pulm: Breathing Normal    Neuro/Psych: Orientation:Alert and Orientedx3 ; Mood/Affect:normal   A/P:  1. Atopic dermatitis   Avoid soaking the skin.   Use dove sensitive cleanser to wash skin daily.   Recommend vanicream, eucerin, cerave moisturizer twice daily.   Apply clobetasol twice daily for 2 weeks then change to tacrolimus ointment twice daily as needed.   Recheck in one month.